# Patient Record
Sex: MALE | HISPANIC OR LATINO | Employment: FULL TIME | URBAN - METROPOLITAN AREA
[De-identification: names, ages, dates, MRNs, and addresses within clinical notes are randomized per-mention and may not be internally consistent; named-entity substitution may affect disease eponyms.]

---

## 2018-03-19 ENCOUNTER — APPOINTMENT (OUTPATIENT)
Dept: RADIOLOGY | Facility: MEDICAL CENTER | Age: 48
End: 2018-03-19
Attending: EMERGENCY MEDICINE

## 2018-03-19 ENCOUNTER — RESOLUTE PROFESSIONAL BILLING HOSPITAL PROF FEE (OUTPATIENT)
Dept: HOSPITALIST | Facility: MEDICAL CENTER | Age: 48
End: 2018-03-19

## 2018-03-19 ENCOUNTER — HOSPITAL ENCOUNTER (OUTPATIENT)
Facility: MEDICAL CENTER | Age: 48
End: 2018-03-20
Attending: EMERGENCY MEDICINE | Admitting: HOSPITALIST

## 2018-03-19 DIAGNOSIS — R07.9 CHEST PAIN, UNSPECIFIED TYPE: ICD-10-CM

## 2018-03-19 LAB
ALBUMIN SERPL BCP-MCNC: 4.3 G/DL (ref 3.2–4.9)
ALBUMIN/GLOB SERPL: 1.3 G/DL
ALP SERPL-CCNC: 49 U/L (ref 30–99)
ALT SERPL-CCNC: 20 U/L (ref 2–50)
ANION GAP SERPL CALC-SCNC: 11 MMOL/L (ref 0–11.9)
APTT PPP: 27 SEC (ref 24.7–36)
AST SERPL-CCNC: 14 U/L (ref 12–45)
BASOPHILS # BLD AUTO: 0.4 % (ref 0–1.8)
BASOPHILS # BLD: 0.04 K/UL (ref 0–0.12)
BILIRUB SERPL-MCNC: 0.7 MG/DL (ref 0.1–1.5)
BNP SERPL-MCNC: 5 PG/ML (ref 0–100)
BUN SERPL-MCNC: 16 MG/DL (ref 8–22)
CALCIUM SERPL-MCNC: 9.3 MG/DL (ref 8.5–10.5)
CHLORIDE SERPL-SCNC: 103 MMOL/L (ref 96–112)
CO2 SERPL-SCNC: 23 MMOL/L (ref 20–33)
CREAT SERPL-MCNC: 0.8 MG/DL (ref 0.5–1.4)
EKG IMPRESSION: NORMAL
EKG IMPRESSION: NORMAL
EOSINOPHIL # BLD AUTO: 0.06 K/UL (ref 0–0.51)
EOSINOPHIL NFR BLD: 0.7 % (ref 0–6.9)
ERYTHROCYTE [DISTWIDTH] IN BLOOD BY AUTOMATED COUNT: 38.8 FL (ref 35.9–50)
GLOBULIN SER CALC-MCNC: 3.2 G/DL (ref 1.9–3.5)
GLUCOSE SERPL-MCNC: 183 MG/DL (ref 65–99)
HCT VFR BLD AUTO: 45.2 % (ref 42–52)
HGB BLD-MCNC: 16.1 G/DL (ref 14–18)
IMM GRANULOCYTES # BLD AUTO: 0.02 K/UL (ref 0–0.11)
IMM GRANULOCYTES NFR BLD AUTO: 0.2 % (ref 0–0.9)
INR PPP: 1.06 (ref 0.87–1.13)
LIPASE SERPL-CCNC: 17 U/L (ref 11–82)
LYMPHOCYTES # BLD AUTO: 2.49 K/UL (ref 1–4.8)
LYMPHOCYTES NFR BLD: 27.2 % (ref 22–41)
MCH RBC QN AUTO: 30.6 PG (ref 27–33)
MCHC RBC AUTO-ENTMCNC: 35.6 G/DL (ref 33.7–35.3)
MCV RBC AUTO: 85.8 FL (ref 81.4–97.8)
MONOCYTES # BLD AUTO: 0.55 K/UL (ref 0–0.85)
MONOCYTES NFR BLD AUTO: 6 % (ref 0–13.4)
NEUTROPHILS # BLD AUTO: 6.01 K/UL (ref 1.82–7.42)
NEUTROPHILS NFR BLD: 65.5 % (ref 44–72)
NRBC # BLD AUTO: 0 K/UL
NRBC BLD-RTO: 0 /100 WBC
PLATELET # BLD AUTO: 247 K/UL (ref 164–446)
PMV BLD AUTO: 9.6 FL (ref 9–12.9)
POTASSIUM SERPL-SCNC: 4 MMOL/L (ref 3.6–5.5)
PROT SERPL-MCNC: 7.5 G/DL (ref 6–8.2)
PROTHROMBIN TIME: 13.5 SEC (ref 12–14.6)
RBC # BLD AUTO: 5.27 M/UL (ref 4.7–6.1)
SODIUM SERPL-SCNC: 137 MMOL/L (ref 135–145)
TROPONIN I SERPL-MCNC: <0.01 NG/ML (ref 0–0.04)
WBC # BLD AUTO: 9.2 K/UL (ref 4.8–10.8)

## 2018-03-19 PROCEDURE — 83690 ASSAY OF LIPASE: CPT

## 2018-03-19 PROCEDURE — 93005 ELECTROCARDIOGRAM TRACING: CPT

## 2018-03-19 PROCEDURE — 99285 EMERGENCY DEPT VISIT HI MDM: CPT

## 2018-03-19 PROCEDURE — 85025 COMPLETE CBC W/AUTO DIFF WBC: CPT

## 2018-03-19 PROCEDURE — 700102 HCHG RX REV CODE 250 W/ 637 OVERRIDE(OP): Performed by: EMERGENCY MEDICINE

## 2018-03-19 PROCEDURE — 96372 THER/PROPH/DIAG INJ SC/IM: CPT

## 2018-03-19 PROCEDURE — 85730 THROMBOPLASTIN TIME PARTIAL: CPT

## 2018-03-19 PROCEDURE — A9270 NON-COVERED ITEM OR SERVICE: HCPCS | Performed by: EMERGENCY MEDICINE

## 2018-03-19 PROCEDURE — 73630 X-RAY EXAM OF FOOT: CPT | Mod: LT

## 2018-03-19 PROCEDURE — 36415 COLL VENOUS BLD VENIPUNCTURE: CPT

## 2018-03-19 PROCEDURE — 84484 ASSAY OF TROPONIN QUANT: CPT

## 2018-03-19 PROCEDURE — 93005 ELECTROCARDIOGRAM TRACING: CPT | Performed by: EMERGENCY MEDICINE

## 2018-03-19 PROCEDURE — 700111 HCHG RX REV CODE 636 W/ 250 OVERRIDE (IP): Performed by: HOSPITALIST

## 2018-03-19 PROCEDURE — 71045 X-RAY EXAM CHEST 1 VIEW: CPT

## 2018-03-19 PROCEDURE — G0378 HOSPITAL OBSERVATION PER HR: HCPCS

## 2018-03-19 PROCEDURE — 99220 PR INITIAL OBSERVATION CARE,LEVL III: CPT | Performed by: HOSPITALIST

## 2018-03-19 PROCEDURE — 85610 PROTHROMBIN TIME: CPT

## 2018-03-19 PROCEDURE — 80053 COMPREHEN METABOLIC PANEL: CPT

## 2018-03-19 PROCEDURE — 83880 ASSAY OF NATRIURETIC PEPTIDE: CPT

## 2018-03-19 RX ORDER — LORAZEPAM 1 MG/1
0.5 TABLET ORAL EVERY 6 HOURS PRN
Status: DISCONTINUED | OUTPATIENT
Start: 2018-03-19 | End: 2018-03-20 | Stop reason: HOSPADM

## 2018-03-19 RX ORDER — INSULIN GLARGINE 100 [IU]/ML
23 INJECTION, SOLUTION SUBCUTANEOUS EVERY EVENING
Status: DISCONTINUED | OUTPATIENT
Start: 2018-03-19 | End: 2018-03-20 | Stop reason: HOSPADM

## 2018-03-19 RX ORDER — ASPIRIN 325 MG
325 TABLET ORAL ONCE
Status: COMPLETED | OUTPATIENT
Start: 2018-03-19 | End: 2018-03-19

## 2018-03-19 RX ORDER — HEPARIN SODIUM 5000 [USP'U]/ML
5000 INJECTION, SOLUTION INTRAVENOUS; SUBCUTANEOUS EVERY 8 HOURS
Status: DISCONTINUED | OUTPATIENT
Start: 2018-03-19 | End: 2018-03-20 | Stop reason: HOSPADM

## 2018-03-19 RX ORDER — LISINOPRIL 2.5 MG/1
2.5 TABLET ORAL DAILY
Status: DISCONTINUED | OUTPATIENT
Start: 2018-03-20 | End: 2018-03-20 | Stop reason: HOSPADM

## 2018-03-19 RX ORDER — AMOXICILLIN 250 MG
2 CAPSULE ORAL 2 TIMES DAILY
Status: DISCONTINUED | OUTPATIENT
Start: 2018-03-19 | End: 2018-03-20 | Stop reason: HOSPADM

## 2018-03-19 RX ORDER — LORAZEPAM 2 MG/ML
0.5 INJECTION INTRAMUSCULAR EVERY 6 HOURS PRN
Status: DISCONTINUED | OUTPATIENT
Start: 2018-03-19 | End: 2018-03-20 | Stop reason: HOSPADM

## 2018-03-19 RX ORDER — PROMETHAZINE HYDROCHLORIDE 25 MG/1
12.5-25 TABLET ORAL EVERY 4 HOURS PRN
Status: DISCONTINUED | OUTPATIENT
Start: 2018-03-19 | End: 2018-03-20 | Stop reason: HOSPADM

## 2018-03-19 RX ORDER — ASPIRIN 325 MG
325 TABLET ORAL DAILY
Status: DISCONTINUED | OUTPATIENT
Start: 2018-03-20 | End: 2018-03-20 | Stop reason: HOSPADM

## 2018-03-19 RX ORDER — TAMSULOSIN HYDROCHLORIDE 0.4 MG/1
0.4 CAPSULE ORAL EVERY EVENING
COMMUNITY

## 2018-03-19 RX ORDER — ONDANSETRON 4 MG/1
4 TABLET, ORALLY DISINTEGRATING ORAL EVERY 4 HOURS PRN
Status: DISCONTINUED | OUTPATIENT
Start: 2018-03-19 | End: 2018-03-20 | Stop reason: HOSPADM

## 2018-03-19 RX ORDER — BISACODYL 10 MG
10 SUPPOSITORY, RECTAL RECTAL
Status: DISCONTINUED | OUTPATIENT
Start: 2018-03-19 | End: 2018-03-20 | Stop reason: HOSPADM

## 2018-03-19 RX ORDER — ONDANSETRON 2 MG/ML
4 INJECTION INTRAMUSCULAR; INTRAVENOUS EVERY 4 HOURS PRN
Status: DISCONTINUED | OUTPATIENT
Start: 2018-03-19 | End: 2018-03-20 | Stop reason: HOSPADM

## 2018-03-19 RX ORDER — LISINOPRIL 2.5 MG/1
2.5 TABLET ORAL DAILY
COMMUNITY

## 2018-03-19 RX ORDER — ACETAMINOPHEN 325 MG/1
650 TABLET ORAL EVERY 6 HOURS PRN
Status: DISCONTINUED | OUTPATIENT
Start: 2018-03-19 | End: 2018-03-20 | Stop reason: HOSPADM

## 2018-03-19 RX ORDER — POLYETHYLENE GLYCOL 3350 17 G/17G
1 POWDER, FOR SOLUTION ORAL
Status: DISCONTINUED | OUTPATIENT
Start: 2018-03-19 | End: 2018-03-20 | Stop reason: HOSPADM

## 2018-03-19 RX ORDER — SODIUM CHLORIDE 9 MG/ML
INJECTION, SOLUTION INTRAVENOUS CONTINUOUS
Status: DISCONTINUED | OUTPATIENT
Start: 2018-03-19 | End: 2018-03-20 | Stop reason: HOSPADM

## 2018-03-19 RX ORDER — DEXTROSE MONOHYDRATE 25 G/50ML
25 INJECTION, SOLUTION INTRAVENOUS
Status: DISCONTINUED | OUTPATIENT
Start: 2018-03-19 | End: 2018-03-20 | Stop reason: HOSPADM

## 2018-03-19 RX ORDER — PROMETHAZINE HYDROCHLORIDE 25 MG/1
12.5-25 SUPPOSITORY RECTAL EVERY 4 HOURS PRN
Status: DISCONTINUED | OUTPATIENT
Start: 2018-03-19 | End: 2018-03-20 | Stop reason: HOSPADM

## 2018-03-19 RX ORDER — TAMSULOSIN HYDROCHLORIDE 0.4 MG/1
0.4 CAPSULE ORAL EVERY EVENING
Status: DISCONTINUED | OUTPATIENT
Start: 2018-03-19 | End: 2018-03-20 | Stop reason: HOSPADM

## 2018-03-19 RX ADMIN — HEPARIN SODIUM 5000 UNITS: 5000 INJECTION, SOLUTION INTRAVENOUS; SUBCUTANEOUS at 23:15

## 2018-03-19 RX ADMIN — ASPIRIN 325 MG: 325 TABLET ORAL at 21:03

## 2018-03-19 ASSESSMENT — LIFESTYLE VARIABLES
HOW MANY TIMES IN THE PAST YEAR HAVE YOU HAD 5 OR MORE DRINKS IN A DAY: 0
TOTAL SCORE: 0
HAVE YOU EVER FELT YOU SHOULD CUT DOWN ON YOUR DRINKING: NO
EVER FELT BAD OR GUILTY ABOUT YOUR DRINKING: NO
EVER HAD A DRINK FIRST THING IN THE MORNING TO STEADY YOUR NERVES TO GET RID OF A HANGOVER: NO
CONSUMPTION TOTAL: NEGATIVE
ON A TYPICAL DAY WHEN YOU DRINK ALCOHOL HOW MANY DRINKS DO YOU HAVE: 2
TOTAL SCORE: 0
TOTAL SCORE: 0
AVERAGE NUMBER OF DAYS PER WEEK YOU HAVE A DRINK CONTAINING ALCOHOL: 1
HAVE PEOPLE ANNOYED YOU BY CRITICIZING YOUR DRINKING: NO
DO YOU DRINK ALCOHOL: YES

## 2018-03-19 ASSESSMENT — PAIN SCALES - GENERAL
PAINLEVEL_OUTOF10: 6
PAINLEVEL_OUTOF10: 7

## 2018-03-19 NOTE — ED TRIAGE NOTES
48 y/o male ambulate to triage   Chief Complaint   Patient presents with   • Chest Pain     x months, increase pain last night    • Syncope     this AM, while in the restroom    • Leg Pain     left, after falling in the restroom - unwitness    • Leg Swelling

## 2018-03-20 ENCOUNTER — APPOINTMENT (OUTPATIENT)
Dept: RADIOLOGY | Facility: MEDICAL CENTER | Age: 48
End: 2018-03-20
Attending: HOSPITALIST

## 2018-03-20 ENCOUNTER — PATIENT OUTREACH (OUTPATIENT)
Dept: HEALTH INFORMATION MANAGEMENT | Facility: OTHER | Age: 48
End: 2018-03-20

## 2018-03-20 VITALS
SYSTOLIC BLOOD PRESSURE: 101 MMHG | HEART RATE: 67 BPM | OXYGEN SATURATION: 96 % | DIASTOLIC BLOOD PRESSURE: 67 MMHG | HEIGHT: 63 IN | BODY MASS INDEX: 32.85 KG/M2 | WEIGHT: 185.41 LBS | RESPIRATION RATE: 17 BRPM | TEMPERATURE: 97 F

## 2018-03-20 PROBLEM — E11.9 DIABETES (HCC): Chronic | Status: ACTIVE | Noted: 2018-03-20

## 2018-03-20 PROBLEM — R07.9 CHEST PAIN: Status: ACTIVE | Noted: 2018-03-20

## 2018-03-20 PROBLEM — I10 HYPERTENSION: Chronic | Status: ACTIVE | Noted: 2018-03-20

## 2018-03-20 PROBLEM — R55 SYNCOPE: Status: ACTIVE | Noted: 2018-03-20

## 2018-03-20 LAB
GLUCOSE BLD-MCNC: 118 MG/DL (ref 65–99)
GLUCOSE BLD-MCNC: 164 MG/DL (ref 65–99)
GLUCOSE BLD-MCNC: 189 MG/DL (ref 65–99)
LV EJECT FRACT  99904: 65
LV EJECT FRACT MOD 2C 99903: 67.64
LV EJECT FRACT MOD 4C 99902: 61.14
LV EJECT FRACT MOD BP 99901: 63.91
TROPONIN I SERPL-MCNC: <0.01 NG/ML (ref 0–0.04)
TROPONIN I SERPL-MCNC: <0.01 NG/ML (ref 0–0.04)

## 2018-03-20 PROCEDURE — A9270 NON-COVERED ITEM OR SERVICE: HCPCS | Performed by: HOSPITALIST

## 2018-03-20 PROCEDURE — 700102 HCHG RX REV CODE 250 W/ 637 OVERRIDE(OP): Performed by: HOSPITALIST

## 2018-03-20 PROCEDURE — 82962 GLUCOSE BLOOD TEST: CPT | Mod: 91

## 2018-03-20 PROCEDURE — 700111 HCHG RX REV CODE 636 W/ 250 OVERRIDE (IP): Performed by: HOSPITALIST

## 2018-03-20 PROCEDURE — 700105 HCHG RX REV CODE 258: Performed by: HOSPITALIST

## 2018-03-20 PROCEDURE — 93306 TTE W/DOPPLER COMPLETE: CPT

## 2018-03-20 PROCEDURE — 90471 IMMUNIZATION ADMIN: CPT

## 2018-03-20 PROCEDURE — 84484 ASSAY OF TROPONIN QUANT: CPT

## 2018-03-20 PROCEDURE — 96372 THER/PROPH/DIAG INJ SC/IM: CPT

## 2018-03-20 PROCEDURE — G0378 HOSPITAL OBSERVATION PER HR: HCPCS

## 2018-03-20 PROCEDURE — 700111 HCHG RX REV CODE 636 W/ 250 OVERRIDE (IP)

## 2018-03-20 PROCEDURE — 90686 IIV4 VACC NO PRSV 0.5 ML IM: CPT | Performed by: HOSPITALIST

## 2018-03-20 PROCEDURE — 93306 TTE W/DOPPLER COMPLETE: CPT | Mod: 26 | Performed by: INTERNAL MEDICINE

## 2018-03-20 PROCEDURE — 36415 COLL VENOUS BLD VENIPUNCTURE: CPT

## 2018-03-20 PROCEDURE — 99217 PR OBSERVATION CARE DISCHARGE: CPT | Performed by: HOSPITALIST

## 2018-03-20 PROCEDURE — 78452 HT MUSCLE IMAGE SPECT MULT: CPT

## 2018-03-20 RX ORDER — REGADENOSON 0.08 MG/ML
INJECTION, SOLUTION INTRAVENOUS
Status: COMPLETED
Start: 2018-03-20 | End: 2018-03-20

## 2018-03-20 RX ADMIN — SODIUM CHLORIDE: 9 INJECTION, SOLUTION INTRAVENOUS at 00:52

## 2018-03-20 RX ADMIN — REGADENOSON 0.4 MG: 0.08 INJECTION, SOLUTION INTRAVENOUS at 11:27

## 2018-03-20 RX ADMIN — ACETAMINOPHEN 650 MG: 325 TABLET, FILM COATED ORAL at 00:21

## 2018-03-20 RX ADMIN — ASPIRIN 325 MG: 325 TABLET ORAL at 08:44

## 2018-03-20 RX ADMIN — INSULIN GLARGINE 23 UNITS: 100 INJECTION, SOLUTION SUBCUTANEOUS at 00:59

## 2018-03-20 RX ADMIN — INSULIN HUMAN 3 UNITS: 100 INJECTION, SOLUTION PARENTERAL at 13:40

## 2018-03-20 RX ADMIN — HEPARIN SODIUM 5000 UNITS: 5000 INJECTION, SOLUTION INTRAVENOUS; SUBCUTANEOUS at 06:23

## 2018-03-20 RX ADMIN — INFLUENZA A VIRUS A/MICHIGAN/45/2015 X-275 (H1N1) ANTIGEN (FORMALDEHYDE INACTIVATED), INFLUENZA A VIRUS A/HONG KONG/4801/2014 X-263B (H3N2) ANTIGEN (FORMALDEHYDE INACTIVATED), INFLUENZA B VIRUS B/PHUKET/3073/2013 ANTIGEN (FORMALDEHYDE INACTIVATED), AND INFLUENZA B VIRUS B/BRISBANE/60/2008 ANTIGEN (FORMALDEHYDE INACTIVATED) 0.5 ML: 15; 15; 15; 15 INJECTION, SUSPENSION INTRAMUSCULAR at 08:45

## 2018-03-20 RX ADMIN — TAMSULOSIN HYDROCHLORIDE 0.4 MG: 0.4 CAPSULE ORAL at 00:52

## 2018-03-20 ASSESSMENT — LIFESTYLE VARIABLES
HAVE PEOPLE ANNOYED YOU BY CRITICIZING YOUR DRINKING: NO
ALCOHOL_USE: YES
EVER_SMOKED: NEVER
CONSUMPTION TOTAL: NEGATIVE
EVER_SMOKED: NEVER
HOW MANY TIMES IN THE PAST YEAR HAVE YOU HAD 5 OR MORE DRINKS IN A DAY: 0
TOTAL SCORE: 0
EVER HAD A DRINK FIRST THING IN THE MORNING TO STEADY YOUR NERVES TO GET RID OF A HANGOVER: NO
ON A TYPICAL DAY WHEN YOU DRINK ALCOHOL HOW MANY DRINKS DO YOU HAVE: 2
DO YOU DRINK ALCOHOL: YES
TOTAL SCORE: 0
EVER FELT BAD OR GUILTY ABOUT YOUR DRINKING: NO
AVERAGE NUMBER OF DAYS PER WEEK YOU HAVE A DRINK CONTAINING ALCOHOL: 1
HAVE YOU EVER FELT YOU SHOULD CUT DOWN ON YOUR DRINKING: NO
TOTAL SCORE: 0
ALCOHOL_AMOUNT: 2

## 2018-03-20 ASSESSMENT — PAIN SCALES - GENERAL
PAINLEVEL_OUTOF10: 5
PAINLEVEL_OUTOF10: 2
PAINLEVEL_OUTOF10: 0

## 2018-03-20 ASSESSMENT — PATIENT HEALTH QUESTIONNAIRE - PHQ9
SUM OF ALL RESPONSES TO PHQ QUESTIONS 1-9: 0
SUM OF ALL RESPONSES TO PHQ9 QUESTIONS 1 AND 2: 0
1. LITTLE INTEREST OR PLEASURE IN DOING THINGS: NOT AT ALL
2. FEELING DOWN, DEPRESSED, IRRITABLE, OR HOPELESS: NOT AT ALL

## 2018-03-20 NOTE — PROGRESS NOTES
Pt given and understands discharge instructions; PIV removed, covered and wrapped with coban.  Pt ambulated to [facility], discharged home with family.

## 2018-03-20 NOTE — PROGRESS NOTES
Labs collected and sent. Pt resting in bed, states chest pain is minimal at this time, rating 2/10. Tele continues: SR with BBB at 66 bpm.

## 2018-03-20 NOTE — ED NOTES
Report to Anurag Vines. Pt to be transported shortly with all belongings. VSS. NAD noted, Resp even and unlabored at time of transport.

## 2018-03-20 NOTE — DISCHARGE PLANNING
Patient has an 18-year-old daughter and a 5-year-old son. His daughter is able to watch his son.      Care Transition Team Assessment    Information Source  Orientation : Oriented x 4  Information Given By: Patient  Informant's Name: Mj Smith  Who is responsible for making decisions for patient? : Patient    Readmission Evaluation  Is this a readmission?: No    Elopement Risk  Legal Hold: No  Elopement Risk: Not at Risk for Elopement    Interdisciplinary Discharge Planning  Does Admitting Nurse Feel This Could be a Complex Discharge?: No  Primary Care Physician: ANNA Allen  Lives with - Patient's Self Care Capacity: Adult Children, Child Less than 18 Years of Age  Patient or legal guardian wants to designate a caregiver (see row info): No  Support Systems: Family Member(s) (Sister.)  Housing / Facility: 1 Story Apartment / Condo  Do You Take your Prescribed Medications Regularly: Yes (Uses pharmacy at [facility]  Able to Return to Previous ADL's: Yes  Mobility Issues: No  Prior Services: None  Patient Expects to be Discharged to:: Home.  Assistance Needed: No  Durable Medical Equipment: Not Applicable    Discharge Preparedness  What is your plan after discharge?: Home with help  What are your discharge supports?: Child, Sibling  Prior Functional Level: Ambulatory, Drives Self, Independent with Activities of Daily Living  Difficulity with ADLs: None  Difficulity with IADLs: None    Functional Assesment  Prior Functional Level: Ambulatory, Drives Self, Independent with Activities of Daily Living    Finances  Financial Barriers to Discharge: No (Is employed.)  Prescription Coverage: Yes    Vision / Hearing Impairment  Vision Impairment : Yes  Right Eye Vision: Wears Glasses (For reading.)  Left Eye Vision: Wears Glasses (For reading.)  Hearing Impairment : No    Values / Beliefs / Concerns  Values / Beliefs Concerns : No    Advance Directive  Advance Directive?: None  Advance  Directive offered?: AD Booklet given    Domestic Abuse  Have you ever been the victim of abuse or violence?: No  Physical Abuse or Sexual Abuse: No  Verbal Abuse or Emotional Abuse: No  Possible Abuse Reported to:: Not Applicable    Psychological Assessment  History of Substance Abuse: None  History of Psychiatric Problems: No  Non-compliant with Treatment: No  Newly Diagnosed Illness: No    Discharge Risks or Barriers  Discharge risks or barriers?: No    Anticipated Discharge Information  Anticipated discharge disposition: Home  Discharge Address: [address] [place] [address]  Discharge Contact Phone Number: [phone] (mobile)

## 2018-03-20 NOTE — ED PROVIDER NOTES
"ED Provider Note    CHIEF COMPLAINT  Chief Complaint   Patient presents with   • Chest Pain     x months, increase pain last night    • Syncope     this AM, while in the restroom    • Leg Pain     left, after falling in the restroom - unwitness    • Leg Swelling       HPI  Mj Smith is a 47 y.o. male who presents to the emergency department after syncopal episode. The patient states has some periodic chest pain over last couple months. He describes the pain as sharp and located in the left substernal region. There is no radicular component. Today he states that he awoke and had some chest pain followed by palpitations and then had a syncopal episode. The patient did not have any focal weakness. He continues have some periodic chest pain throughout the day today. He does have diabetes and hypertension but no known cardiac disease. The patient states he did injure his left foot when he had a syncopal episode today. He has some pain to the distal aspect of his left fourth and fifth metatarsals as well as his left fourth and fifth phalanx.    REVIEW OF SYSTEMS  See HPI for further details. All other systems are negative.     PAST MEDICAL HISTORY  Past Medical History:   Diagnosis Date   • Diabetes (CMS-HCC)    • Hypertension        SOCIAL HISTORY  Social History     Social History   • Marital status:      Spouse name: N/A   • Number of children: N/A   • Years of education: N/A     Social History Main Topics   • Smoking status: Never Smoker   • Smokeless tobacco: Never Used   • Alcohol use Yes      Comment: occasionally 2 beers/month   • Drug use: No   • Sexual activity: Not on file     Other Topics Concern   • Not on file     Social History Narrative   • No narrative on file           PHYSICAL EXAM  VITAL SIGNS: /79   Pulse 70   Temp 36.6 °C (97.9 °F)   Resp 17   Ht 1.702 m (5' 7\")   Wt 84 kg (185 lb 3 oz)   SpO2 99%   BMI 29.00 kg/m²   Constitutional: Well developed, Well nourished, No " acute distress, Non-toxic appearance.   HENT: Normocephalic, Atraumatic, tympanic membranes are intact and nonerythematous bilaterally, Oropharynx moist without exudates or erythema, Nose normal.   Eyes: PERRLA, EOMI, Conjunctiva normal.  Neck: Supple without meningismus  Lymphatic: No lymphadenopathy noted.   Cardiovascular: Normal heart rate, Normal rhythm, No murmurs, No rubs, No gallops.   Thorax & Lungs: Normal breath sounds, No respiratory distress, No wheezing, No chest tenderness.   Abdomen: Bowel sounds normal, Soft, No tenderness, no rebound, no guarding, no distention, No masses, No pulsatile masses.   Skin: Warm, Dry, No erythema, No rash.   Back: No tenderness, No CVA tenderness.   Extremities: Ecchymosis and tenderness to the distal aspect of the left fourth and fifth metatarsals as well as the fourth and fifth phalanx on the left foot. Extremities otherwise Atraumatic with symmetric distal pulses, No edema, No tenderness, No cyanosis, No clubbing.   Neurologic: Alert & oriented x 3, cranial nerves II through XII are intact, Normal motor function, Normal sensory function, No focal deficits noted.   Psychiatric: Affect normal, Judgment normal, Mood normal.     EKG  12-lead EKG interpreted by myself shows a normal sinus rhythm with a ventricular rate 86, normal QRS, normal intervals, no ST segment elevation or depression, normal T waves.      COURSE & MEDICAL DECISION MAKING  Pertinent Labs & Imaging studies reviewed. (See chart for details)  This a 47-year-old gentleman who presents to emergency department with chest pain, palpitations, and a syncopal episode. The patient is at risk from a cardiac standpoint with his diabetes and hypertension. At this time his EKG is not showing any evidence of ischemia nor arrhythmic changes. Furthermore his cardiac markers are negative. The patient be admitted to the observation unit for further cardiac monitoring as well as workup. As for the foot pain and x-ray OB  ordered in the hospital still follow up on the results to make sure there is no acute fracture. Otherwise the bladder does not show any evidence of a metabolic disturbance nor evidence of an infectious process that could cause his presenting symptoms. The patient will receive aspirin and he'll be admitted in stable condition.    FINAL IMPRESSION  1. Chest pain  2. Palpitations  3. Syncope  4. Left foot contusion possible occult fracture     Disposition  The patient will be admitted in stable condition    Electronically signed by: Desean Little, 3/19/2018 8:54 PM

## 2018-03-20 NOTE — DISCHARGE INSTRUCTIONS
Dolor de pecho inespecífico  (Nonspecific Chest Pain)  El dolor de pecho puede deberse a muchas enfermedades diferentes. Siempre existe slim posibilidad de que el dolor esté relacionado con algo grave, ladan un infarto de miocardio o un coágulo sanguíneo en los pulmones. Hay muchas enfermedades que no son potencialmente mortales que pueden causar dolor de pecho. Si tiene dolor de pecho, es muy importante que se controle con el médico.  CAUSAS  Las causas del dolor de pecho pueden ser las siguientes:  · Acidez estomacal.  · Neumonía o bronquitis.  · Ansiedad o estrés.  · Inflamación de la mack que rodea al corazón (pericarditis) o a los pulmones (pleuritis o pleuresía).  · Un coágulo sanguíneo en el pulmón.  · Colapso de un pulmón (neumotórax), que puede aparecer de manera repentina por sí solo (neumotórax espontáneo) o debido a un traumatismo en el tórax.  · Culebrilla (virus de la varicela zóster).  · Infarto de miocardio.  · Daño de los huesos, los músculos y los cartílagos que conforman la pared torácica. [place] puede incluir lo siguiente:  ¨ Hematomas óseos debido a lesiones.  ¨ Distensiones musculares o de los cartílagos por tos frecuente o repetida, o por exceso de trabajo.  ¨ Fractura de slim o más costillas.  ¨ Dolor de cartílago debido a inflamación (costocondritis).  FACTORES DE RIESGO  Los factores de riesgo de tener dolor de pecho pueden incluir lo siguiente:  · Actividades que incrementan el riesgo de sufrir traumatismos o lesiones en el tórax.  · Infecciones o enfermedades respiratorias que causan tos frecuente.  · Enfermedades o excesos en las comidas que pueden causar acidez.  · Enfermedades cardíacas o antecedentes familiares de enfermedades cardíacas.  · Enfermedades o comportamientos de katharine que aumentan el riesgo de tener un coágulo sanguíneo.  · Jennifer tenido varicela (varicela zóster).  SIGNOS Y SÍNTOMAS  El dolor de pecho puede provocar las siguientes sensaciones:  · Ardor u hormigueo en la  superficie o en lo profundo del pecho.  · Dolor opresivo, continuo o constrictivo.  · Dolor vago o intenso que empeora al moverse, toser o inhalar profundamente.  · Dolor que también se siente en la espalda, el mauricio, el hombro o el brazo, o dolor que se irradia a cualquiera de estas zonas.  El dolor de pecho puede aparecer y desaparecer, o joe puede ser adarsh.  DIAGNÓSTICO  Quizás se necesiten análisis de laboratorio u otros estudios para encontrar la causa del dolor. El médico puede indicarle que se mateo slim prueba llamada EGC (electrocadiograma) ambulatorio. El electrocardiograma registra los patrones de los latidos cardíacos en el momento en que se realiza el estudio. También pueden hacerle otros estudios, por ejemplo:  · Ecocardiograma transtorácico (ETT). Gayle el ecocardiograma, se usan ondas sonoras para crear slim imagen de todas las estructuras cardíacas y evaluar cómo circula la lisa por el corazón.  · Ecocardiograma transesofágico (ETE). Cindy es un estudio de diagnóstico por imágenes más avanzado que el obtiene imágenes del interior del cuerpo. Le permite al médico alan el corazón con mayor detalle.  · Monitoreo cardíaco. Permite que el médico controle la frecuencia y el ritmo cardíaco en tiempo real.  · Monitor Holter. Es un dispositivo portátil que registra los latidos del corazón y puede ayudar a diagnosticar las arritmias cardíacas. Le permite al médico registrar la actividad cardíaca gayle varios días, si es necesario.  · Pruebas de esfuerzo. Estas pueden realizarse gayle el ejercicio o mediante la administración de un medicamento que acelera los latidos del corazón.  · Análisis de lisa.  · Diagnóstico por imágenes.  TRATAMIENTO  El tratamiento depende de la causa del dolor de pecho. El tratamiento puede incluir lo siguiente:  · Medicamentos. Estos pueden incluir lo siguiente:  ¨ Inhibidores de la acidez estomacal.  ¨ Antiinflamatorios.  ¨ Analgésicos para las enfermedades  inflamatorias.  ¨ Antibióticos, si hay slim infección.  ¨ Medicamentos para disolver los coágulos sanguíneos.  ¨ Medicamentos para tratar la enfermedad arterial coronaria.  · Tratamiento complementario para las enfermedades que no requieren la masha de medicamentos. [place] puede incluir lo siguiente:  ¨ Descansar.  ¨ Aplicar compresas frías o calientes en las zonas lesionadas.  ¨ Limitar las actividades hasta que disminuya el dolor.  INSTRUCCIONES PARA EL CUIDADO EN EL HOGAR  · Si le recetaron antibióticos, asegúrese de terminarlos, incluso si comienza a sentirse mejor.  · Evite las actividades que le causen dolor de pecho.  · No consuma ningún producto que contenga tabaco, lo que incluye cigarrillos, tabaco de mascar o cigarrillos electrónicos. Si necesita ayuda para dejar de fumar, consulte al médico.  · No last alcohol.  · [place] los medicamentos solamente ladan se lo haya indicado el médico.  · Concurra a todas las visitas de control ladan se lo haya indicado el médico. [place] es importante. [place] incluye otros estudios si el dolor de pecho no desaparece.  · Si la acidez es la causa del dolor de pecho, alfred vez le aconsejen que mantenga la nadira levantada (elevada) mientras duerme. [place] reduce la probabilidad de que el ácido retroceda del estómago al esófago.  · Camilla cambios en gaffney estilo de marlena ladan se lo haya indicado el médico. Estos pueden incluir lo siguiente:  ¨ Practicar actividad física con regularidad. Pida al médico que le sugiera algunas actividades que giovana seguras para usted.  ¨ Consumir slim dieta cardiosaludable. Un nutricionista matriculado puede ayudarlo a hacer elecciones saludables.  ¨ Mantener un peso saludable.  ¨ Controlar la diabetes, si es necesario.  ¨ Reducir las situaciones de estrés.  SOLICITE ATENCIÓN MÉDICA SI:  · El dolor de pecho no desaparece después del tratamiento.  · Tiene slim erupción cutánea con ampollas en el pecho.  · Tiene fiebre.  SOLICITE ATENCIÓN MÉDICA DE INMEDIATO SI:  · El  dolor en el pecho es más intenso.  · La tos empeora, o expectora lisa.  · Siente un dolor abdominal intenso.  · Siente debilidad intensa.  · Se desmaya.  · Tiene escalofríos.  · Tiene slim molestia repentina e inexplicable en el pecho.  · Tiene molestias repentinas e inexplicables en los brazos, la espalda, el mauricio o la mandíbula.  · Le falta el aire en cualquier momento.  · Comienza a sudar de manera repentina o la piel se le humedece.  · Siente náuseas o vomita.  · Se siente repentinamente mareado o se desmaya.  · Siente que el corazón comienza a latir rápidamente o que se saltea latidos.  Estos síntomas pueden representar un problema grave que constituye slim emergencia. No espere hasta que los síntomas desaparezcan. Solicite atención médica de inmediato. Comuníquese con el servicio de emergencias de gaffney localidad (911 en los [place]). No conduzca por pieter propios medios hasta el hospital.   Esta información no tiene ladan fin reemplazar el consejo del médico. Asegúrese de hacerle al médico cualquier pregunta que tenga.  Document Released: 12/18/2006 Document Revised: 01/08/2016 Document Reviewed: 06/26/2017  [employer] Interactive Patient Education © 2017 [employer]  Discharge Instructions    Discharged to home by car with relative. Discharged via walking, hospital escort: Yes.  Special equipment needed: Not Applicable    Be sure to schedule a follow-up appointment with your primary care doctor or any specialists as instructed.     Discharge Plan:   Diet Plan: Discussed  Activity Level: Discussed  Confirmed Follow up Appointment: Appointment Scheduled  Confirmed Symptoms Management: Discussed  Medication Reconciliation Updated: Yes  Influenza Vaccine Indication: Indicated: 9 to 64 years of age  Influenza Vaccine Given - only chart on this line when given: Influenza Vaccine Given (See MAR)    I understand that a diet low in cholesterol, fat, and sodium is recommended for good health. Unless I have been  given specific instructions below for another diet, I accept this instruction as my diet prescription.   Other diet: Regular    Special Instructions: None    · Is patient discharged on Warfarin / Coumadin?   No     Depression / Suicide Risk    As you are discharged from this [facility] facility, it is important to learn how to keep safe from harming yourself.    Recognize the warning signs:  · Abrupt changes in personality, positive or negative- including increase in energy   · Giving away possessions  · Change in eating patterns- significant weight changes-  positive or negative  · Change in sleeping patterns- unable to sleep or sleeping all the time   · Unwillingness or inability to communicate  · Depression  · Unusual sadness, discouragement and loneliness  · Talk of wanting to die  · Neglect of personal appearance   · Rebelliousness- reckless behavior  · Withdrawal from people/activities they love  · Confusion- inability to concentrate     If you or a loved one observes any of these behaviors or has concerns about self-harm, here's what you can do:  · Talk about it- your feelings and reasons for harming yourself  · Remove any means that you might use to hurt yourself (examples: pills, rope, extension cords, firearm)  · Get professional help from the community (Mental Health, Substance Abuse, psychological counseling)  · Do not be alone:Call your Safe Contact- someone whom you trust who will be there for you.  · Call your local CRISIS HOTLINE 894-9624 or 674-664-1115  · Call your local Children's Mobile Crisis Response Team [place] (882) 677-0107 or www.E96  · Call the toll free National Suicide Prevention Hotlines   · National Suicide Prevention Lifeline 514-897-OJJT (3603)  · National Hope Line Network 800-SUICIDE (558-7222)

## 2018-03-20 NOTE — ASSESSMENT & PLAN NOTE
Shelby has risk factors for heart disease  Trend cardiac enzymes  Cardiac monitoring  Aspirin  Stress test in the morning

## 2018-03-20 NOTE — PROGRESS NOTES
Assessment and admission profile completed. Tele initiated: SR with BBB at 63 bpm. pt rates chest pain as a 5/10 described as pressure like, medicated per MAR. Pt updated on POC and NPO status for stress test in AM. Green top collected and sent.

## 2018-03-20 NOTE — ED NOTES
Pt stated hx of leg swelling, hx of HTN, Dm. Pt stated taking metformin, lisinopril, levemir, tamulosin daily.

## 2018-03-20 NOTE — ASSESSMENT & PLAN NOTE
May have been vasovagal or orthostatic  IV fluids  Follow-up echocardiogram  Cardiac monitoring as he was complaining of palpitations

## 2018-03-20 NOTE — H&P
DATE OF ADMISSION:  03/19/2018    CHIEF COMPLAINT:  Chest and passing out.    HISTORY OF PRESENT ILLNESS:  Patient is a 47-year-old male with history of   diabetes and hypertension.  He comes in to the hospital today with chest pain   and passing out.  Apparently, the patient awakened at 4:00 a.m. and he was   going to use the restroom to urinate. While he was in restroom, he apparently   passed out.  Patient reports prior to this, he was having palpitations and he   felt his heart was racing.  He has never had this before.  Nonetheless, the   chest discomfort continued throughout the day, so he decided to come to the   hospital for evaluation.  He has no other complaints. The chest pain did not   radiate anywhere.  He denies any shortness of breath associated with it.  No   fevers, chills or cough.    REVIEW OF SYSTEMS:  As per HPI.  All other systems have been reviewed and are   negative.    PAST MEDICAL HISTORY:  1.  Diabetes.  2.  Hypertension.    HOME MEDICATIONS:  1.  Levemir 23 units every evening.  2.  Lisinopril 2.5 mg daily.  3.  Flomax 0.4 mg every evening.  4.  Metformin 1000 mg b.i.d.    PAST SURGICAL HISTORY:  He had a cholecystectomy.    SOCIAL HISTORY:  Denies tobacco or drug use. Occasional alcohol use.    FAMILY HISTORY:  Has been reviewed and is not pertinent to his current   presentation.    ALLERGIES:  No known drug allergies.    PHYSICAL EXAMINATION:  VITAL SIGNS:  Blood pressure 121/79, pulse of 70, respirations 13, temperature   36.6, oxygen saturations 99% on room air, weight 84 kilograms.  GENERAL:  Patient is pleasant, resting comfortably, currently not in any acute   distress.  HEENT:  Moist mucous membranes.  No oropharyngeal exudates.  Eyes, EOMI,   PERRLA.  NECK:  No lymphadenopathy, no JVD.  CARDIOVASCULAR:  Regular rate and rhythm.  No murmurs.  LUNGS:  Clear to auscultation bilaterally.  No rales, rhonchi, or wheezes.  ABDOMEN:  Positive bowel sounds, soft, nontender,  nondistended.  EXTREMITIES:  No clubbing, cyanosis, or edema.  NEUROLOGIC:  Awake, alert, and oriented to person, place, time, and situation.    LABORATORY DATA:  CBC is unremarkable.  Glucose 183.  Rest of CMP is   unremarkable.  Troponin less than 0.01.  BNP is 5.    IMAGING:  EKG shows sinus rhythm.    ASSESSMENT AND PLAN:  Patient is a 47-year-old male who presents to the   hospital with chest pain and episode of passing out.  1.  Chest pain.  He definitely has risk factors.  At this point, we will admit   him to telemetry unit under observation status.  We will obtain 2 more sets   of cardiac enzymes.  I have started him on aspirin and we will obtain a   pharmacological stress test.  2.  Syncope.  He states he was having some palpitations prior to this   happening and may have been orthostatic or vasovagal.  Nonetheless, we will   put him under observation status and cardiac monitoring as stated above and we   will also obtain an echocardiogram of his heart.  3.  Diabetes.  We will continue with his home medications except for metformin   and put him on sliding scale insulin instead.  4.  Hypertension.  Continue lisinopril.  5.  Deep venous thrombosis prophylaxis, heparin 5000 units t.i.d.  6.  He is a full code.       ____________________________________     MD BEENA Gonzalez / WOO    DD:  03/19/2018 22:52:17  DT:  03/20/2018 00:30:35    D#:  [id number]  Job#:  [id number]

## 2018-03-20 NOTE — ED NOTES
Vitals repeated.  Chest pain protocol initiated.  Blood drawn and sent to lab.  Apologized for wait and assured that he would be roomed as soon as possible.

## 2018-03-20 NOTE — ED TRIAGE NOTES
Pt amb to room. Family at bedside. Agree with triage nurse's assessment. Pt denies nausea/vomiting, dizziness, lightheadedness at this time. Pt stated left chest pressure. Pt denies pain or radiating pressure/pain at this time. Pt denied hitting his head when he experienced syncopal episode this morning. ERP to see. Pt placed on monitor.

## 2018-03-20 NOTE — PROGRESS NOTES
Assumed care of Pt at 1220 after report from CORIN Lowry, assessment complete.  Pt resting comfortably with family at bedside; A & O X 4, denies pain, discomfort while waiting for test results.  Bed in low position, call light within reach - will continue to monitor.

## 2021-07-08 NOTE — DISCHARGE SUMMARY
CHIEF COMPLAINT ON ADMISSION  Chest pain and syncopal episodes.    HPI & HOSPITAL COURSE  This is a 47 y.o. year old male with a past medical history of diabetes and hypertension here with complaints of chest pain after having 2 episodes of passing out. The patient reported he was using the restroom during the night and felt his heart racing and then passed out shortly after. He also reports another syncopal episode later on that day while standing in the kitchen. On admission troponin remained negative. Cardiac stress test was negative for inducible ischemia. Echocardiogram revealed an EF of 65% with no other acute abnormality. The patient's vital signs remained stable. At this time he is at baseline with no chest pain and is ambulating at his functional baseline. There is concern for possible dysrhythmia although while in the hospital he remained in normal sinus rhythm. He is recommended to follow up with his PCP and have a Holter monitor to rule out dysrhythmia. At this time there is no further need for acute intervention and the patient will be discharged home in good stable condition.    DISCHARGE PROBLEM LIST  Active Hospital Problems    Diagnosis   • Chest pain [R07.9]   • Syncope [R55]   • Diabetes (CMS-HCC) [E11.9]   • Hypertension [I10]     Resolved problems  1. Chest pain  2. Syncope    FOLLOW UP  The patient will follow with his PCP in 1-2 weeks after discharge.    MEDICATIONS ON DISCHARGE   Mj Smith   [facility] Medication Instructions EUGEN[facility]:[id number]    Printed on:03/20/18 [id number]   Medication Information                      insulin detemir (LEVEMIR) 100 UNIT/ML Solution  Inject 23 Units as instructed every evening.             lisinopril (PRINIVIL) 2.5 MG Tab  Take 2.5 mg by mouth every day.             metformin (GLUCOPHAGE) 1000 MG tablet  Take 1,000 mg by mouth 2 times a day, with meals.             tamsulosin (FLOMAX) 0.4 MG capsule  Take 0.4 mg by mouth every evening.                  DIET  Orders Placed This Encounter   Procedures   • Diet Order     Standing Status:   Standing     Number of Occurrences:   1     Order Specific Question:   Diet:     Answer:   Diabetic [3]       ACTIVITY  As tolerated.      LABORATORY  Lab Results   Component Value Date/Time    SODIUM 137 03/19/2018 05:46 PM    POTASSIUM 4.0 03/19/2018 05:46 PM    CHLORIDE 103 03/19/2018 05:46 PM    CO2 23 03/19/2018 05:46 PM    GLUCOSE 183 (H) 03/19/2018 05:46 PM    BUN 16 03/19/2018 05:46 PM    CREATININE 0.80 03/19/2018 05:46 PM    CREATININE 0.9 08/10/2005 04:35 PM        Lab Results   Component Value Date/Time    WBC 9.2 03/19/2018 05:46 PM    HEMOGLOBIN 16.1 03/19/2018 05:46 PM    HEMATOCRIT 45.2 03/19/2018 05:46 PM    PLATELETCT 247 03/19/2018 05:46 PM        Total time of the discharge process was 34 minutes.   Cimetidine Counseling:  I discussed with the patient the risks of Cimetidine including but not limited to gynecomastia, headache, diarrhea, nausea, drowsiness, arrhythmias, pancreatitis, skin rashes, psychosis, bone marrow suppression and kidney toxicity.